# Patient Record
Sex: MALE | Race: WHITE | URBAN - METROPOLITAN AREA
[De-identification: names, ages, dates, MRNs, and addresses within clinical notes are randomized per-mention and may not be internally consistent; named-entity substitution may affect disease eponyms.]

---

## 2017-09-29 ENCOUNTER — THERAPY VISIT (OUTPATIENT)
Dept: PHYSICAL THERAPY | Facility: CLINIC | Age: 37
End: 2017-09-29
Payer: OTHER MISCELLANEOUS

## 2017-09-29 DIAGNOSIS — M25.561 ACUTE PAIN OF RIGHT KNEE: Primary | ICD-10-CM

## 2017-09-29 PROCEDURE — 97110 THERAPEUTIC EXERCISES: CPT | Mod: GP | Performed by: PHYSICAL THERAPIST

## 2017-09-29 PROCEDURE — 97161 PT EVAL LOW COMPLEX 20 MIN: CPT | Mod: GP | Performed by: PHYSICAL THERAPIST

## 2017-09-29 PROCEDURE — 97014 ELECTRIC STIMULATION THERAPY: CPT | Mod: GP | Performed by: PHYSICAL THERAPIST

## 2017-09-29 ASSESSMENT — ACTIVITIES OF DAILY LIVING (ADL)
PAIN: THE SYMPTOM AFFECTS MY ACTIVITY SLIGHTLY
HOW_WOULD_YOU_RATE_THE_CURRENT_FUNCTION_OF_YOUR_KNEE_DURING_YOUR_USUAL_DAILY_ACTIVITIES_ON_A_SCALE_FROM_0_TO_100_WITH_100_BEING_YOUR_LEVEL_OF_KNEE_FUNCTION_PRIOR_TO_YOUR_INJURY_AND_0_BEING_THE_INABILITY_TO_PERFORM_ANY_OF_YOUR_USUAL_DAILY_ACTIVITIES?: 50
GIVING WAY, BUCKLING OR SHIFTING OF KNEE: THE SYMPTOM AFFECTS MY ACTIVITY MODERATELY
KNEE_ACTIVITY_OF_DAILY_LIVING_SCORE: 47.14
LIMPING: THE SYMPTOM AFFECTS MY ACTIVITY SEVERELY
GO DOWN STAIRS: ACTIVITY IS FAIRLY DIFFICULT
KNEEL ON THE FRONT OF YOUR KNEE: ACTIVITY IS VERY DIFFICULT
WALK: ACTIVITY IS VERY DIFFICULT
STAND: ACTIVITY IS MINIMALLY DIFFICULT
STIFFNESS: THE SYMPTOM AFFECTS MY ACTIVITY SLIGHTLY
GO UP STAIRS: ACTIVITY IS FAIRLY DIFFICULT
RAW_SCORE: 33
SWELLING: THE SYMPTOM AFFECTS MY ACTIVITY SLIGHTLY
WEAKNESS: THE SYMPTOM AFFECTS MY ACTIVITY SLIGHTLY
AS_A_RESULT_OF_YOUR_KNEE_INJURY,_HOW_WOULD_YOU_RATE_YOUR_CURRENT_LEVEL_OF_DAILY_ACTIVITY?: NEARLY NORMAL
KNEE_ACTIVITY_OF_DAILY_LIVING_SUM: 33
SIT WITH YOUR KNEE BENT: ACTIVITY IS MINIMALLY DIFFICULT
RISE FROM A CHAIR: ACTIVITY IS MINIMALLY DIFFICULT
HOW_WOULD_YOU_RATE_THE_OVERALL_FUNCTION_OF_YOUR_KNEE_DURING_YOUR_USUAL_DAILY_ACTIVITIES?: NEARLY NORMAL
SQUAT: I AM UNABLE TO DO THE ACTIVITY

## 2017-09-29 NOTE — PROGRESS NOTES
Subjective:    Patient is a 37 year old male presenting with rehab left ankle/foot hpi.                                      Pertinent medical history includes:  None.  Medical allergies: no.  Other surgeries include:  None reported.  Current medications:  Anti-inflammatory.  Current occupation is [employer].                       Knee Activity of Daily Living Score: 47.14            Objective:    System    Physical Exam    General     ROS    Assessment/Plan:

## 2017-09-29 NOTE — LETTER
"[facility]  [address] [address] [place] [address]  [phone]    2017    Re: Javier Bernard   :   1980  MRN:  [id number]   REFERRING PHYSICIAN:   Mirta Walker    [facility]    Date of Initial Evaluation:  2017  Visits:  Rxs Used: 1  Reason for Referral:  Acute pain of right knee    EVALUATION SUMMARY  [facility] Initial Evaluation    Subjective:     Javier Bernard is a 37 year old male with a right knee condition.  Occurance: crouched behind freezer at work and felt pop when romain to stand.  Condition occurred: at work.  This is a new condition  2017. Patient reports pain:  Medial.    Pain is described as sharp and is intermittent and reported as 3/10.  Associated symptoms:  Buckling/giving out. Pain is the same all the time.  Symptoms are exacerbated by weight bearing, standing, walking, kneeling, descending stairs and ascending stairs (brace) and relieved by ice, rest and NSAID's (using walking stick at home and ace wrap).  Since onset symptoms are gradually improving.  Special testing: no imaging.      General health as reported by patient is fair (\"overweight otherwise fine\").   Patient is currently not working due to present treatment problem.   Pertinent medical history includes:  None.  Medical allergies: no.  Other surgeries include:  None reported.  Current medications:  Anti-inflammatory.  Current occupation is [employer].  Knee Activity of Daily Living Score: 47.14. Barriers include:  None as reported by the patient.  Red flags:  None as reported by the patient.    Objective:    Gait:    Gait Type:  Antalgic   Weight Bearing Status:  PWB   Assistive Devices:  Cane  Deviations:  General Deviations:  Stance time decr  Flexibility/Screens:   Positive screens:  Knee  Knee Evaluation:  ROM:  Strength:  Normal (no pain with resisted right knee flex or ext)  PROM  Hyperextension: Left: 5    " Right:  0+  Flexion: Left: 125    Right:  115+  Ligament Testing:  Normal  Special Tests:   Right knee positive for the following tests:  Meniscal  Right knee negative for the following special tests:  Patellar Compression; Patellar Tracking-Abduction Medial; Patellar Tracking-Abduction Lateral and IT Band Friction  Palpation:    Right knee tenderness present at:  Medial Joint Line  Right knee tenderness not present at:  Lateral Joint Line; Patellar Tendon; IT Band; Patellar Medial; Patellar Lateral; Patellar Superior and Patellar Inferior  Edema:  Edema of the knee: medial joint line effusion right knee.  Mobility Testing:  Not Assessed  Functional Testing:  not assessed    Assessment/Plan:      Patient is a 37 year old male with right side knee complaints.    Patient has the following significant findings with corresponding treatment plan.                  Diagnosis 1:  Suspect Medial Meniscus Tear       Pain -  hot/cold therapy, electric stimulation, self management, education and home program  Decreased ROM/flexibility - manual therapy, therapeutic exercise, therapeutic activity and home program  Edema - electric stimulation, cold therapy and self management/home program  Impaired gait - gait training, assistive devices and home program  Impaired muscle performance - neuro re-education and home program  Decreased function - therapeutic activities and home program    Therapy Evaluation Codes:   1) History comprised of:   Personal factors that impact the plan of care:      None.    Comorbidity factors that impact the plan of care are:      None.     Medications impacting care: Anti-inflammatory.  2) Examination of Body Systems comprised of:   Body structures and functions that impact the plan of care:      Knee.   Activity limitations that impact the plan of care are:      Bending, Lifting, Squatting/kneeling, Stairs, Standing, Walking and Working.  3) Clinical presentation characteristics  are:   Stable/Uncomplicated.  4) Decision-Making    Low complexity using standardized patient assessment instrument and/or measureable assessment of functional outcome.  Cumulative Therapy Evaluation is: Low complexity.    Previous and current functional limitations:  (See Goal Flow Sheet for this information)    Short term and Long term goals: (See Goal Flow Sheet for this information)     Communication ability:  Patient appears to be able to clearly communicate and understand verbal and written communication and follow directions correctly.  Treatment Explanation - The following has been discussed with the patient:   RX ordered/plan of care  Anticipated outcomes  Possible risks and side effects  This patient would benefit from PT intervention to resume normal activities.   Rehab potential is good.    Frequency:  2 X week, once daily  Duration:  for 3 weeks  Discharge Plan:  Achieve all LTG.  Independent in home treatment program.  Return to work with or without restrictions.  Return to previous functional level by discharge.  Reach maximal therapeutic benefit.    Thank you for your referral.    INQUIRIES  Therapist: Cecelia De Luna, PT   [address] [address]  Phone: [phone]  Fax: [phone]

## 2017-09-29 NOTE — PROGRESS NOTES
"[facility] Initial Evaluation    Subjective:    Patient is a 37 year old male presenting with rehab right knee hpi. The history is provided by the patient. No  was used.   Javier Bernard is a 37 year old male with a right knee condition.  Occurance: crouched behind freezer at work and felt pop when romain to stand.  Condition occurred: at work.  This is a new condition  Sept 25, 2017.    Patient reports pain:  Medial.    Pain is described as sharp and is intermittent and reported as 3/10.  Associated symptoms:  Buckling/giving out. Pain is the same all the time.  Symptoms are exacerbated by weight bearing, standing, walking, kneeling, descending stairs and ascending stairs (brace) and relieved by ice, rest and NSAID's (using walking stick at home and ace wrap).  Since onset symptoms are gradually improving.  Special testing: no imaging.      General health as reported by patient is fair (\"overweight otherwise fine\").            Patient is currently not working due to present treatment problem.      Barriers include:  None as reported by the patient.    Red flags:  None as reported by the patient.                        Objective:      Gait:    Gait Type:  Antalgic   Weight Bearing Status:  PWB   Assistive Devices:  Cane  Deviations:  General Deviations:  Stance time decr    Flexibility/Screens:   Positive screens:  Knee                                                          Knee Evaluation:  ROM:  Strength:  Normal (no pain with resisted right knee flex or ext)    PROM    Hyperextension: Left: 5    Right:  0+    Flexion: Left: 125    Right:  115+        Ligament Testing:  Normal                Special Tests:     Right knee positive for the following tests:  Meniscal  Right knee negative for the following special tests:  Patellar Compression; Patellar Tracking-Abduction Medial; Patellar Tracking-Abduction Lateral and IT Band Friction  Palpation:      Right knee tenderness " present at:  Medial Joint Line  Right knee tenderness not present at:  Lateral Joint Line; Patellar Tendon; IT Band; Patellar Medial; Patellar Lateral; Patellar Superior and Patellar Inferior  Edema:  Edema of the knee: medial joint line effusion right knee.    Mobility Testing:  Not Assessed            Functional Testing:  not assessed                  General     ROS    Assessment/Plan:      Patient is a 37 year old male with right side knee complaints.    Patient has the following significant findings with corresponding treatment plan.                  Diagnosis 1:  Suspect Medial Meniscus Tear       Pain -  hot/cold therapy, electric stimulation, self management, education and home program  Decreased ROM/flexibility - manual therapy, therapeutic exercise, therapeutic activity and home program  Edema - electric stimulation, cold therapy and self management/home program  Impaired gait - gait training, assistive devices and home program  Impaired muscle performance - neuro re-education and home program  Decreased function - therapeutic activities and home program    Therapy Evaluation Codes:   1) History comprised of:   Personal factors that impact the plan of care:      None.    Comorbidity factors that impact the plan of care are:      None.     Medications impacting care: Anti-inflammatory.  2) Examination of Body Systems comprised of:   Body structures and functions that impact the plan of care:      Knee.   Activity limitations that impact the plan of care are:      Bending, Lifting, Squatting/kneeling, Stairs, Standing, Walking and Working.  3) Clinical presentation characteristics are:   Stable/Uncomplicated.  4) Decision-Making    Low complexity using standardized patient assessment instrument and/or measureable assessment of functional outcome.  Cumulative Therapy Evaluation is: Low complexity.    Previous and current functional limitations:  (See Goal Flow Sheet for this information)    Short term and  Long term goals: (See Goal Flow Sheet for this information)     Communication ability:  Patient appears to be able to clearly communicate and understand verbal and written communication and follow directions correctly.  Treatment Explanation - The following has been discussed with the patient:   RX ordered/plan of care  Anticipated outcomes  Possible risks and side effects  This patient would benefit from PT intervention to resume normal activities.   Rehab potential is good.    Frequency:  2 X week, once daily  Duration:  for 3 weeks  Discharge Plan:  Achieve all LTG.  Independent in home treatment program.  Return to work with or without restrictions.  Return to previous functional level by discharge.  Reach maximal therapeutic benefit.    Please refer to the daily flowsheet for treatment today, total treatment time and time spent performing 1:1 timed codes.

## 2017-10-02 ENCOUNTER — THERAPY VISIT (OUTPATIENT)
Dept: PHYSICAL THERAPY | Facility: CLINIC | Age: 37
End: 2017-10-02
Payer: OTHER MISCELLANEOUS

## 2017-10-02 DIAGNOSIS — M25.561 ACUTE PAIN OF RIGHT KNEE: ICD-10-CM

## 2017-10-02 PROCEDURE — 97530 THERAPEUTIC ACTIVITIES: CPT | Mod: GP | Performed by: PHYSICAL THERAPIST

## 2017-10-02 PROCEDURE — 97110 THERAPEUTIC EXERCISES: CPT | Mod: GP | Performed by: PHYSICAL THERAPIST

## 2017-10-02 PROCEDURE — 97014 ELECTRIC STIMULATION THERAPY: CPT | Mod: GP | Performed by: PHYSICAL THERAPIST

## 2017-10-04 ENCOUNTER — THERAPY VISIT (OUTPATIENT)
Dept: PHYSICAL THERAPY | Facility: CLINIC | Age: 37
End: 2017-10-04
Payer: OTHER MISCELLANEOUS

## 2017-10-04 DIAGNOSIS — M25.561 ACUTE PAIN OF RIGHT KNEE: ICD-10-CM

## 2017-10-04 PROCEDURE — 97530 THERAPEUTIC ACTIVITIES: CPT | Mod: GP | Performed by: PHYSICAL THERAPIST

## 2017-10-04 PROCEDURE — 97014 ELECTRIC STIMULATION THERAPY: CPT | Mod: GP | Performed by: PHYSICAL THERAPIST

## 2017-10-04 PROCEDURE — 97110 THERAPEUTIC EXERCISES: CPT | Mod: GP | Performed by: PHYSICAL THERAPIST

## 2017-10-04 NOTE — PROGRESS NOTES
"Subjective:    HPI                    Objective:    System    Physical Exam    General     ROS    Assessment/Plan:      PROGRESS  REPORT    Progress reporting period is from 9-29-17 to 10-4-17.       SUBJECTIVE  Subjective changes noted by patient: Javier reports good and bad days with right knee. Often can not bear full weight due to hyperextension and \"popping\" episodes. Unable to kneel due to pain and numbness. Ascending/descending stairs one step at a time (not reciprocal). Currently scheduled to return to work Monday, Oct. 9th.      Current pain level is: 7/10.     Previous pain level was: 5/10.   Changes in function:  None  Adverse reaction to treatment or activity: None    OBJECTIVE  Changes noted in objective findings:  AROM right knee = 0-0-120 (+5 degrees flexion) with pain at end ranges. TTP along medial joint line. Suspect medial meniscal tear.      ASSESSMENT/PLAN  Updated problem list and treatment plan: Diagnosis 1:  Right Knee Pain  Pain -  hot/cold therapy, electric stimulation, self management, education and home program  Decreased ROM/flexibility - manual therapy, therapeutic exercise, therapeutic activity and home program  Decreased strength - therapeutic exercise, therapeutic activities and home program  Edema - electric stimulation, cold therapy and self management/home program  Impaired gait - gait training and home program  Impaired muscle performance - neuro re-education and home program  Decreased function - therapeutic activities and home program  STG/LTGs have been met or progress has been made towards goals:  None  Assessment of Progress: The patient's condition is unchanged.  Self Management Plans:  Patient has been instructed in a home treatment program.  Patient  has been instructed in self management of symptoms.  I have re-evaluated this patient and find that the nature, scope, duration and intensity of the therapy is appropriate for the medical condition of the patient.  Javier " continues to require the following intervention to meet STG and LTG's:  PT    Recommendations:  This patient would benefit from continued therapy and further evaluation/diagnostics. To schedule follow-up with MD before return to work Monday.    Frequency:  2 X week, once daily  Duration:  for 3 weeks          Please refer to the daily flowsheet for treatment today, total treatment time and time spent performing 1:1 timed codes.

## 2017-10-04 NOTE — LETTER
"[facility]  [address] [place] [address]  [phone]    2017    Re: Javier Bernard   :   1980  MRN:  [id number]   REFERRING PHYSICIAN:   Mirta Walker    [facility]    Date of Initial Evaluation:  17  Visits:  Rxs Used: 3  Reason for Referral:  Acute pain of right knee    PROGRESS  REPORT    Progress reporting period is from 17 to 10-4-17.       SUBJECTIVE  Subjective changes noted by patient: Javier reports good and bad days with right knee. Often can not bear full weight due to hyperextension and \"popping\" episodes. Unable to kneel due to pain and numbness. Ascending/descending stairs one step at a time (not reciprocal). Currently scheduled to return to work Monday, Oct. 9th.      Current pain level is: 7/10.     Previous pain level was: 5/10.   Changes in function:  None  Adverse reaction to treatment or activity: None    OBJECTIVE  Changes noted in objective findings:  AROM right knee = 0-0-120 (+5 degrees flexion) with pain at end ranges. TTP along medial joint line. Suspect medial meniscal tear.      ASSESSMENT/PLAN  Updated problem list and treatment plan: Diagnosis 1:  Right Knee Pain  Pain -  hot/cold therapy, electric stimulation, self management, education and home program  Decreased ROM/flexibility - manual therapy, therapeutic exercise, therapeutic activity and home program  Decreased strength - therapeutic exercise, therapeutic activities and home program  Edema - electric stimulation, cold therapy and self management/home program  Impaired gait - gait training and home program  Impaired muscle performance - neuro re-education and home program  Decreased function - therapeutic activities and home program  STG/LTGs have been met or progress has been made towards goals:  None  Assessment of Progress: The patient's condition is unchanged.  Self Management Plans:  Patient has been instructed in a home treatment " program.  Patient  has been instructed in self management of symptoms.  I have re-evaluated this patient and find that the nature, scope, duration and intensity of the therapy is appropriate for the medical condition of the patient.  Javier continues to require the following intervention to meet STG and LTG's:  PT    Recommendations:  This patient would benefit from continued therapy and further evaluation/diagnostics. To schedule follow-up with MD before return to work Monday.    Frequency:  2 X week, once daily  Duration:  for 3 weeks    Thank you for your referral.    INQUIRIES  Therapist: Cecelia De Luna, PT  [address] [address]  Phone: [phone]  Fax: [phone]

## 2017-10-10 ENCOUNTER — THERAPY VISIT (OUTPATIENT)
Dept: PHYSICAL THERAPY | Facility: CLINIC | Age: 37
End: 2017-10-10
Payer: OTHER MISCELLANEOUS

## 2017-10-10 DIAGNOSIS — M25.561 ACUTE PAIN OF RIGHT KNEE: ICD-10-CM

## 2017-10-10 PROCEDURE — 97530 THERAPEUTIC ACTIVITIES: CPT | Mod: GP | Performed by: PHYSICAL THERAPIST

## 2017-10-10 PROCEDURE — 97110 THERAPEUTIC EXERCISES: CPT | Mod: GP | Performed by: PHYSICAL THERAPIST

## 2017-10-10 PROCEDURE — 97014 ELECTRIC STIMULATION THERAPY: CPT | Mod: GP | Performed by: PHYSICAL THERAPIST

## 2017-11-28 PROBLEM — M25.561 ACUTE PAIN OF RIGHT KNEE: Status: RESOLVED | Noted: 2017-09-29 | Resolved: 2017-11-28

## 2017-11-28 ASSESSMENT — ACTIVITIES OF DAILY LIVING (ADL)
RISE FROM A CHAIR: ACTIVITY IS MINIMALLY DIFFICULT
PAIN: THE SYMPTOM AFFECTS MY ACTIVITY SLIGHTLY
HOW_WOULD_YOU_RATE_THE_OVERALL_FUNCTION_OF_YOUR_KNEE_DURING_YOUR_USUAL_DAILY_ACTIVITIES?: ABNORMAL
HOW_WOULD_YOU_RATE_THE_CURRENT_FUNCTION_OF_YOUR_KNEE_DURING_YOUR_USUAL_DAILY_ACTIVITIES_ON_A_SCALE_FROM_0_TO_100_WITH_100_BEING_YOUR_LEVEL_OF_KNEE_FUNCTION_PRIOR_TO_YOUR_INJURY_AND_0_BEING_THE_INABILITY_TO_PERFORM_ANY_OF_YOUR_USUAL_DAILY_ACTIVITIES?: 50
SQUAT: I AM UNABLE TO DO THE ACTIVITY
LIMPING: THE SYMPTOM AFFECTS MY ACTIVITY SEVERELY
KNEE_ACTIVITY_OF_DAILY_LIVING_SUM: 33
GIVING WAY, BUCKLING OR SHIFTING OF KNEE: THE SYMPTOM AFFECTS MY ACTIVITY MODERATELY
KNEE_ACTIVITY_OF_DAILY_LIVING_SCORE: 47.14
STIFFNESS: THE SYMPTOM AFFECTS MY ACTIVITY SLIGHTLY
SWELLING: THE SYMPTOM AFFECTS MY ACTIVITY SLIGHTLY
KNEEL ON THE FRONT OF YOUR KNEE: ACTIVITY IS VERY DIFFICULT
GO DOWN STAIRS: ACTIVITY IS FAIRLY DIFFICULT
WALK: ACTIVITY IS VERY DIFFICULT
SIT WITH YOUR KNEE BENT: ACTIVITY IS MINIMALLY DIFFICULT
GO UP STAIRS: ACTIVITY IS FAIRLY DIFFICULT
RAW_SCORE: 33
STAND: ACTIVITY IS MINIMALLY DIFFICULT
WEAKNESS: THE SYMPTOM AFFECTS MY ACTIVITY SLIGHTLY
AS_A_RESULT_OF_YOUR_KNEE_INJURY,_HOW_WOULD_YOU_RATE_YOUR_CURRENT_LEVEL_OF_DAILY_ACTIVITY?: ABNORMAL

## 2017-11-28 NOTE — PROGRESS NOTES
Subjective:    HPI       Knee Activity of Daily Living Score: 47.14            Objective:    System    Physical Exam    General     ROS    Assessment/Plan:      DISCHARGE REPORT    Progress reporting period is from 9-29-17 to 10-10-17.       SUBJECTIVE  Subjective changes noted by patient: Per SOAP note 10-10-17: Javier reports he had an MRI of right knee last Friday but has not yet recieved results. Wearing brace to avoid hyperextending but pain remains the same. Seemed more irritated Sat.     Current pain level is: 5/10.     Previous pain level was: 5/10.   Changes in function:  None  Adverse reaction to treatment or activity: None    OBJECTIVE  Changes noted in objective findings:  Patient has failed to return to therapy so current objective findings are unknown.  Per SOAP note 10-10-17: AROM right knee = 0-0-125 degrees (heel slide). Ambulates with brace in antalgic gait - decreased stance phase. Suspect right medial meniscus tear.       ASSESSMENT/PLAN  Updated problem list and treatment plan: Diagnosis 1:  Right Knee Pain      Pain -  hot/cold therapy, manual therapy, splint/taping/bracing/orthotics, self management, education and home program  Decreased ROM/flexibility - manual therapy, therapeutic exercise and home program  Edema - cold therapy and self management/home program  Impaired gait - gait training and home program  Impaired muscle performance - neuro re-education and home program  Decreased function - therapeutic activities and home program  STG/LTGs have been met or progress has been made towards goals:  None  Assessment of Progress: The patient's condition is unchanged.  Self Management Plans:  Patient is independent in a home treatment program.  Patient is independent in self management of symptoms.  I have re-evaluated this patient and find that the nature, scope, duration and intensity of the therapy is appropriate for the medical condition of the patient.  Javier continues to require the  following intervention to meet STG and LTG's:  PT intervention is no longer required to meet STG/LTG.    Recommendations:  This patient is ready to be discharged from therapy and continue their home treatment program.    Please refer to the daily flowsheet for treatment today, total treatment time and time spent performing 1:1 timed codes.

## 2021-10-11 NOTE — LETTER
[facility]  [address] [address] [place] [address]  [phone]    2017    Re: Javier Bernard   :   1980  MRN:  [id number]   REFERRING PHYSICIAN:   Mirta Walker    [facility]    Date of Initial Evaluation:  17  Visits:  Rxs Used: 4  Reason for Referral:  Acute pain of right knee    DISCHARGE REPORT    Progress reporting period is from 17 to 10-10-17.       SUBJECTIVE  Subjective changes noted by patient: Per SOAP note 10-10-17: Javier reports he had an MRI of right knee last Friday but has not yet recieved results. Wearing brace to avoid hyperextending but pain remains the same. Seemed more irritated Sat.     Current pain level is: 5/10.     Previous pain level was: 5/10.   Changes in function:  None  Adverse reaction to treatment or activity: None  Knee Activity of Daily Living Score: 47.14            OBJECTIVE  Changes noted in objective findings:  Patient has failed to return to therapy so current objective findings are unknown.  Per SOAP note 10-10-17: AROM right knee = 0-0-125 degrees (heel slide). Ambulates with brace in antalgic gait - decreased stance phase. Suspect right medial meniscus tear.       ASSESSMENT/PLAN  Updated problem list and treatment plan: Diagnosis 1:  Right Knee Pain      Pain -  hot/cold therapy, manual therapy, splint/taping/bracing/orthotics, self management, education and home program  Decreased ROM/flexibility - manual therapy, therapeutic exercise and home program  Edema - cold therapy and self management/home program  Impaired gait - gait training and home program  Impaired muscle performance - neuro re-education and home program  Decreased function - therapeutic activities and home program  STG/LTGs have been met or progress has been made towards goals:  None  Assessment of Progress: The patient's condition is unchanged.  Self Management Plans:  Patient is independent in a home treatment  program.  Patient is independent in self management of symptoms.  I have re-evaluated this patient and find that the nature, scope, duration and intensity of the therapy is appropriate for the medical condition of the patient.  Javier continues to require the following intervention to meet STG and LTG's:  PT intervention is no longer required to meet STG/LTG.    Recommendations:  This patient is ready to be discharged from therapy and continue their home treatment program.    Thank you for your referral.    INQUIRIES  Therapist:  Cecelia De Luna PT   [address] [address]  Phone: [phone]  Fax: [phone]      no